# Patient Record
Sex: MALE | Race: BLACK OR AFRICAN AMERICAN | Employment: FULL TIME | URBAN - METROPOLITAN AREA
[De-identification: names, ages, dates, MRNs, and addresses within clinical notes are randomized per-mention and may not be internally consistent; named-entity substitution may affect disease eponyms.]

---

## 2018-01-25 PROBLEM — Z00.00 HEALTHCARE MAINTENANCE: Status: ACTIVE | Noted: 2018-01-25

## 2018-04-02 PROCEDURE — 81001 URINALYSIS AUTO W/SCOPE: CPT | Performed by: FAMILY MEDICINE

## 2020-03-18 PROBLEM — L98.9 SKIN LESION: Status: ACTIVE | Noted: 2020-03-18

## 2020-03-18 PROBLEM — M54.50 CHRONIC BILATERAL LOW BACK PAIN WITHOUT SCIATICA: Status: ACTIVE | Noted: 2020-03-18

## 2020-03-18 PROBLEM — E78.00 ELEVATED LDL CHOLESTEROL LEVEL: Status: ACTIVE | Noted: 2020-03-18

## 2020-03-18 PROBLEM — R79.89 LOW SERUM VITAMIN D: Status: ACTIVE | Noted: 2020-03-18

## 2020-03-18 PROBLEM — Z82.49 FAMILY HISTORY OF EARLY CAD: Status: ACTIVE | Noted: 2020-03-18

## 2020-03-18 PROBLEM — G89.29 CHRONIC BILATERAL LOW BACK PAIN WITHOUT SCIATICA: Status: ACTIVE | Noted: 2020-03-18

## 2022-03-27 ENCOUNTER — APPOINTMENT (OUTPATIENT)
Dept: GENERAL RADIOLOGY | Age: 42
End: 2022-03-27
Attending: NURSE PRACTITIONER

## 2022-03-27 ENCOUNTER — HOSPITAL ENCOUNTER (OUTPATIENT)
Age: 42
Discharge: HOME | End: 2022-03-27

## 2022-03-27 VITALS
SYSTOLIC BLOOD PRESSURE: 122 MMHG | TEMPERATURE: 98 F | HEART RATE: 75 BPM | BODY MASS INDEX: 24.34 KG/M2 | DIASTOLIC BLOOD PRESSURE: 73 MMHG | OXYGEN SATURATION: 99 % | RESPIRATION RATE: 18 BRPM | WEIGHT: 170 LBS | HEIGHT: 70 IN

## 2022-03-27 DIAGNOSIS — S39.012A STRAIN OF LUMBAR REGION, INITIAL ENCOUNTER: Primary | ICD-10-CM

## 2022-03-27 PROCEDURE — 99203 OFFICE O/P NEW LOW 30 MIN: CPT

## 2022-03-27 PROCEDURE — 72110 X-RAY EXAM L-2 SPINE 4/>VWS: CPT | Performed by: NURSE PRACTITIONER

## 2022-03-27 RX ORDER — CYCLOBENZAPRINE HCL 10 MG
10 TABLET ORAL 3 TIMES DAILY PRN
Qty: 20 TABLET | Refills: 0 | Status: SHIPPED | OUTPATIENT
Start: 2022-03-27 | End: 2022-04-03

## 2022-03-27 RX ORDER — METHYLPREDNISOLONE 4 MG/1
TABLET ORAL
Qty: 1 EACH | Refills: 0 | Status: SHIPPED | OUTPATIENT
Start: 2022-03-27

## 2022-03-27 NOTE — ED INITIAL ASSESSMENT (HPI)
Pt here c/o lower back pain. States yesterday he was jumping and played a virtual baseball game, and felt a sharp lower pain to back. Has been icing and using advil. Back injury 2002.

## 2022-07-04 ENCOUNTER — HOSPITAL ENCOUNTER (EMERGENCY)
Age: 42
Discharge: HOME | End: 2022-07-04
Attending: EMERGENCY MEDICINE
Payer: COMMERCIAL

## 2022-07-04 VITALS
RESPIRATION RATE: 14 BRPM | DIASTOLIC BLOOD PRESSURE: 92 MMHG | WEIGHT: 170 LBS | HEART RATE: 77 BPM | BODY MASS INDEX: 24.34 KG/M2 | TEMPERATURE: 98 F | SYSTOLIC BLOOD PRESSURE: 135 MMHG | OXYGEN SATURATION: 98 % | HEIGHT: 70 IN

## 2022-07-04 DIAGNOSIS — K04.7 DENTAL INFECTION: ICD-10-CM

## 2022-07-04 DIAGNOSIS — K08.89 PAIN, DENTAL: Primary | ICD-10-CM

## 2022-07-04 LAB — SARS-COV-2 RNA RESP QL NAA+PROBE: NOT DETECTED

## 2022-07-04 PROCEDURE — 99283 EMERGENCY DEPT VISIT LOW MDM: CPT

## 2022-07-04 RX ORDER — ASPIRIN 325 MG
325 TABLET, DELAYED RELEASE (ENTERIC COATED) ORAL ONCE
Status: DISCONTINUED | OUTPATIENT
Start: 2022-07-04 | End: 2022-07-04

## 2022-07-04 RX ORDER — HYDROCODONE BITARTRATE AND ACETAMINOPHEN 5; 325 MG/1; MG/1
1-2 TABLET ORAL EVERY 6 HOURS PRN
Qty: 10 TABLET | Refills: 0 | Status: SHIPPED | OUTPATIENT
Start: 2022-07-04 | End: 2022-07-05

## 2022-07-04 RX ORDER — PENICILLIN V POTASSIUM 500 MG/1
500 TABLET ORAL 4 TIMES DAILY
Qty: 40 TABLET | Refills: 0 | Status: SHIPPED | OUTPATIENT
Start: 2022-07-04 | End: 2022-07-14

## 2022-07-05 RX ORDER — HYDROCODONE BITARTRATE AND ACETAMINOPHEN 5; 325 MG/1; MG/1
1-2 TABLET ORAL EVERY 6 HOURS PRN
Qty: 10 TABLET | Refills: 0 | Status: SHIPPED | OUTPATIENT
Start: 2022-07-05 | End: 2022-07-10

## 2022-07-05 NOTE — ED PROVIDER NOTES
Pharmacy called stating that the prescription needed a supervising physicians AMBREEN/cosign on the prescription itself. That provider is not here. I reviewed this chart and represcribed the exact dosing and regimen of the Norco that was prescribed yesterday. I took no part in the care of this patient. I am only sending the prescription that was intended yesterday to the pharmacy so the patient has it available to them.   I canceled the initial prescription

## 2025-03-21 ENCOUNTER — HOSPITAL ENCOUNTER (EMERGENCY)
Facility: HOSPITAL | Age: 45
Discharge: HOME | End: 2025-03-21
Attending: EMERGENCY MEDICINE
Payer: COMMERCIAL

## 2025-03-21 VITALS
RESPIRATION RATE: 16 BRPM | SYSTOLIC BLOOD PRESSURE: 122 MMHG | HEART RATE: 64 BPM | WEIGHT: 167.56 LBS | TEMPERATURE: 98 F | BODY MASS INDEX: 24 KG/M2 | OXYGEN SATURATION: 100 % | DIASTOLIC BLOOD PRESSURE: 82 MMHG

## 2025-03-21 DIAGNOSIS — R58 BLEEDING: Primary | ICD-10-CM

## 2025-03-21 PROCEDURE — 99283 EMERGENCY DEPT VISIT LOW MDM: CPT

## 2025-03-21 PROCEDURE — 12020 TX SUPFC WND DEHSN SMPL CLSR: CPT

## 2025-03-21 NOTE — ED INITIAL ASSESSMENT (HPI)
Pt presents with c/o bleeding after having vasectomy yesterday. Pt denies fever or unusual pain or swelling.

## 2025-03-21 NOTE — ED PROVIDER NOTES
Patient Seen in: [facility] Emergency Department      History     Chief Complaint   Patient presents with    Postop/Procedure Problem     Stated Complaint: bleeding after vasectomy    Subjective:   HPI      Patient had vasectomy done yesterday here today for bleeding from the incision site.    Objective:     Past Medical History:    Healthcare maintenance              History reviewed. No pertinent surgical history.             Social History     Socioeconomic History    Marital status:    Tobacco Use    Smoking status: Former     Current packs/day: 0.00     Types: Cigarettes     Quit date: 2004     Years since quittin.6    Smokeless tobacco: Never   Vaping Use    Vaping status: Never Used   Substance and Sexual Activity    Alcohol use: Yes     Comment: occasional 2x wk    Drug use: No     Social Drivers of Health      Received from [facility]    [facility]                  Physical Exam     ED Triage Vitals [25 1425]   /69   Pulse 72   Resp 16   Temp 98.2 °F (36.8 °C)   Temp src Temporal   SpO2 93 %   O2 Device None (Room air)       Current Vitals:   Vital Signs  BP: 122/82  Pulse: 64  Resp: 16  Temp: 98.2 °F (36.8 °C)  Temp src: Temporal  MAP (mmHg): 94    Oxygen Therapy  SpO2: 100 %  O2 Device: None (Room air)        Physical Exam    Physical Exam   Constitutional: Awake, alert, no distress.  Head: Normocephalic and atraumatic.   Eyes: Conjunctivae are normal.  Pupils equal, round.  Neck: Normal range of motion. Neck supple.   Cardiovascular: Extremities appear well perfused, no cyanosis.  Pulmonary/Chest: Normal effort.  No accessory muscle use.  No clubbing, no cyanosis.  Abdominal: Not distended.  Neurological: Pt is alert and oriented to person, place, and time.  Moves all 4 extremities appropriately.  Skin: Skin is warm and dry.      Incision site noted on the left side of the scrotum, appears to be stitched in place, clean, dry intact with some dried blood.    No  evidence of hematoma or swelling.  No testicular pain or tenderness.      ED Course     Labs Reviewed   RAINBOW DRAW LAVENDER   RAINBOW DRAW LIGHT GREEN   RAINBOW DRAW BLUE              The incision site was gently cleansed with sterile saline soaked gauze    Remained hemostatic    Sealed with Dermabond       MDM          Differential: Hematoma, wound dehiscence, bleeding from surgical site    -Minor bleeding from surgical site now resolved, covered with Dermabond.    Continue remainder of care as outlined by Dr. Thomas    Outside records: Reviewed procedure note from yesterday.  Notes a single incision to accomplish the procedure.    Medical Decision Making      Disposition and Plan     Clinical Impression:  1. Bleeding         Disposition:  Discharge  3/21/2025  4:50 pm    Follow-up:  Sanjiv Thomas MD  [address]   [address] [place] [id number]  [phone]    Follow up            Medications Prescribed:  Discharge Medication List as of 3/21/2025  4:55 PM              Supplementary Documentation: